# Patient Record
Sex: MALE | Race: WHITE | NOT HISPANIC OR LATINO | URBAN - METROPOLITAN AREA
[De-identification: names, ages, dates, MRNs, and addresses within clinical notes are randomized per-mention and may not be internally consistent; named-entity substitution may affect disease eponyms.]

---

## 2020-07-25 ENCOUNTER — TELEPHONE ENCOUNTER (OUTPATIENT)
Dept: URBAN - METROPOLITAN AREA CLINIC 13 | Facility: CLINIC | Age: 70
End: 2020-07-25

## 2020-07-26 ENCOUNTER — TELEPHONE ENCOUNTER (OUTPATIENT)
Dept: URBAN - METROPOLITAN AREA CLINIC 13 | Facility: CLINIC | Age: 70
End: 2020-07-26

## 2020-07-26 RX ORDER — TRIAMTERENE AND HYDROCHLOROTHIAZIDE 37.5; 25 MG/1; MG/1
TAKE 1 TABLET DAILY WITH FOOD TABLET ORAL
Refills: 0 | Status: ACTIVE | COMMUNITY
Start: 2017-11-09

## 2020-07-26 RX ORDER — ASPIRIN 81 MG/1
TAKE 1 TABLET DAILY TABLET ORAL
Refills: 0 | Status: ACTIVE | COMMUNITY
Start: 2017-11-09

## 2020-07-26 RX ORDER — ROSUVASTATIN CALCIUM 10 MG/1
TAKE 1 TABLET DAILY TABLET, FILM COATED ORAL
Refills: 0 | Status: ACTIVE | COMMUNITY
Start: 2017-11-09

## 2020-07-26 RX ORDER — NITROGLYCERIN 0.4 MG/1
PLACE 1 TABLET UNDER THE TONGUE EVERY 5 MINUTES FOR UP TO 3 DOSES AS NEEDED FOR CHEST PAIN.CALL 911 IF PAIN PERSISTS TABLET SUBLINGUAL
Refills: 0 | Status: ACTIVE | COMMUNITY
Start: 2017-11-09

## 2020-07-26 RX ORDER — AMLODIPINE BESYLATE 10 MG
TAKE 1 TABLET DAILY FOR BLOOD PRESSURE TABLET ORAL
Refills: 0 | Status: ACTIVE | COMMUNITY
Start: 2017-11-09

## 2020-07-26 RX ORDER — POLYETHYLENE GLYCOL 3350, SODIUM CHLORIDE, SODIUM BICARBONATE AND POTASSIUM CHLORIDE WITH LEMON FLAVOR 420; 11.2; 5.72; 1.48 G/4L; G/4L; G/4L; G/4L
TAKE 1/2 GALLON AT 5:00 PM DAY BEFORE PROCEDURE, TAKE SECOND 1/2 OF GALLON 6 HRS PRIOR TO PROCEDURE POWDER, FOR SOLUTION ORAL
Qty: 1 | Refills: 0 | Status: ACTIVE | COMMUNITY
Start: 2017-11-09

## 2023-06-22 ENCOUNTER — WEB ENCOUNTER (OUTPATIENT)
Dept: URBAN - METROPOLITAN AREA CLINIC 113 | Facility: CLINIC | Age: 73
End: 2023-06-22

## 2023-06-28 ENCOUNTER — OFFICE VISIT (OUTPATIENT)
Dept: URBAN - METROPOLITAN AREA CLINIC 113 | Facility: CLINIC | Age: 73
End: 2023-06-28
Payer: MEDICARE

## 2023-06-28 VITALS
HEART RATE: 57 BPM | HEIGHT: 71 IN | WEIGHT: 179.2 LBS | DIASTOLIC BLOOD PRESSURE: 60 MMHG | RESPIRATION RATE: 18 BRPM | BODY MASS INDEX: 25.09 KG/M2 | SYSTOLIC BLOOD PRESSURE: 143 MMHG

## 2023-06-28 DIAGNOSIS — R63.0 ANOREXIA: ICD-10-CM

## 2023-06-28 DIAGNOSIS — Z99.81 DEPENDENCE ON CONTINUOUS SUPPLEMENTAL OXYGEN: ICD-10-CM

## 2023-06-28 DIAGNOSIS — J69.0 ASPIRATION PNEUMONIA OF LEFT LOWER LOBE, UNSPECIFIED ASPIRATION PNEUMONIA TYPE: ICD-10-CM

## 2023-06-28 DIAGNOSIS — R63.4 WEIGHT LOSS: ICD-10-CM

## 2023-06-28 DIAGNOSIS — Z86.010 HISTORY OF COLON POLYPS: ICD-10-CM

## 2023-06-28 DIAGNOSIS — R43.2 DYSGEUSIA: ICD-10-CM

## 2023-06-28 PROBLEM — 271801002: Status: ACTIVE | Noted: 2023-06-28

## 2023-06-28 PROBLEM — 79890006: Status: ACTIVE | Noted: 2023-06-28

## 2023-06-28 PROBLEM — 60651000119103: Status: ACTIVE | Noted: 2023-06-28

## 2023-06-28 PROBLEM — 428283002: Status: ACTIVE | Noted: 2023-06-28

## 2023-06-28 PROCEDURE — 99203 OFFICE O/P NEW LOW 30 MIN: CPT | Performed by: NURSE PRACTITIONER

## 2023-06-28 RX ORDER — AMLODIPINE BESYLATE 5 MG/1
1 TABLET TABLET ORAL ONCE A DAY
Status: ACTIVE | COMMUNITY

## 2023-06-28 RX ORDER — ASPIRIN 81 MG/1
TAKE 1 TABLET DAILY TABLET ORAL
Refills: 0 | Status: ACTIVE | COMMUNITY
Start: 2017-11-09

## 2023-06-28 RX ORDER — NITROGLYCERIN 0.4 MG/1
AS DIRECTED TABLET SUBLINGUAL
Status: ACTIVE | COMMUNITY

## 2023-06-28 RX ORDER — ROSUVASTATIN CALCIUM 10 MG/1
TAKE 1 TABLET DAILY TABLET, FILM COATED ORAL
Refills: 0 | Status: ON HOLD | COMMUNITY
Start: 2017-11-09

## 2023-06-28 RX ORDER — POLYETHYLENE GLYCOL 3350, SODIUM CHLORIDE, SODIUM BICARBONATE AND POTASSIUM CHLORIDE WITH LEMON FLAVOR 420; 11.2; 5.72; 1.48 G/4L; G/4L; G/4L; G/4L
TAKE 1/2 GALLON AT 5:00 PM DAY BEFORE PROCEDURE, TAKE SECOND 1/2 OF GALLON 6 HRS PRIOR TO PROCEDURE POWDER, FOR SOLUTION ORAL
Qty: 1 | Refills: 0 | Status: ON HOLD | COMMUNITY
Start: 2017-11-09

## 2023-06-28 RX ORDER — TRIAMTERENE AND HYDROCHLOROTHIAZIDE 37.5; 25 MG/1; MG/1
TAKE 1 TABLET DAILY WITH FOOD TABLET ORAL
Refills: 0 | Status: ON HOLD | COMMUNITY
Start: 2017-11-09

## 2023-06-28 RX ORDER — CHLORTHALIDONE 25 MG/1
AS DIRECTED TABLET ORAL
Status: ACTIVE | COMMUNITY

## 2023-06-28 RX ORDER — AMLODIPINE BESYLATE 10 MG
TAKE 1 TABLET DAILY FOR BLOOD PRESSURE TABLET ORAL
Refills: 0 | Status: ON HOLD | COMMUNITY
Start: 2017-11-09

## 2023-06-28 RX ORDER — NITROGLYCERIN 0.4 MG/1
PLACE 1 TABLET UNDER THE TONGUE EVERY 5 MINUTES FOR UP TO 3 DOSES AS NEEDED FOR CHEST PAIN.CALL 911 IF PAIN PERSISTS TABLET SUBLINGUAL
Refills: 0 | Status: ON HOLD | COMMUNITY
Start: 2017-11-09

## 2023-06-28 RX ORDER — MONTELUKAST 10 MG/1
1 TABLET TABLET, FILM COATED ORAL ONCE A DAY
Status: ACTIVE | COMMUNITY

## 2023-06-28 RX ORDER — VALSARTAN 320 MG/1
1 TABLET TABLET, FILM COATED ORAL ONCE A DAY
Status: ACTIVE | COMMUNITY

## 2023-06-28 RX ORDER — BUDESONIDE AND FORMOTEROL FUMARATE DIHYDRATE 160; 4.5 UG/1; UG/1
2 PUFFS AEROSOL RESPIRATORY (INHALATION) TWICE A DAY
Status: ACTIVE | COMMUNITY

## 2023-06-28 RX ORDER — ATORVASTATIN CALCIUM 40 MG/1
1 TABLET TABLET, FILM COATED ORAL ONCE A DAY
Status: ACTIVE | COMMUNITY

## 2023-06-28 NOTE — HPI-TODAY'S VISIT:
74 yo male referred by Dr. Richard Ramírez for colon cancer screening and unintentional weight loss. A copy of today's visit will be forwarded to the referring provider.  He has a past medical history of asthma, hypertension, hyperlipidemia, tobacco use, COPD on 2L continuous O2, abnormal stress test using thallium in 2021 with normal LHC in 2021 by Dr. Haley/ CAD in 2018, GERD.  CT a/p with contrast 5/31/23: No acute process in abdomen or pelvis. Imaging findings consistent with bronchitis/bronchiolitis or aspiration pneumonitis. A 7 mm subpleural nodule in the left lower lobe previously measuring 4 mm in size. Cholelithiasis.  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 Moderate concentric left ventricular hypertrophy. Impaired relaxation pattern of LV diastolic filling. LV EF estimated to 60-65% with moderately increased LV wall thickening. Mildly thickened  mitral valve. Moderate mitral annular calcification.  Labs 5.31.23: WBC 32, Hg 16.5, Plt 306, Tbili 1.4, ALT 38, AST 30, ALP 92, Na 122, K 45, BUN 8. Crt 0.7, ESR 1,  Labs from 6/15/23 revealed a ferritin 215.9, iron 40, TIBC 241, and iron saturation 17%. Hg was 13, decreased from around 15 a month prior. He was planned for stool cards, of which we do not have results. His WBC is decreasing. He was noted to have pneumonia on chest imaging, though chest XR was normal. He was referred to hematology. Repeat hepatic function panel on 6/1/23 showed normalization of bilirubin.  According to our scales, he is down 10 pounds since his last visit with us in 2017. His last colonoscopy was in February 2011. This was notable for abnormal skin texture in perianal area without ulceration or mass. Removal of a 5 mm tubular adenoma from the ascending colon, 6 mm hyperplastic polyp from the sigmoid colon, and three 3 to 5 mm hyperplastic polyps from the rectum. Diverticulosis in the sigmoid colon. A repeat colonoscopy was recommended in 3 years time.  He had sudden onset of dysgeusia, loss of appetite, and ultimately lost around 20 pounds over about 2 weeks. He also had about 4 days worth of diarrhea. He was hospitalized from 6/2/23 to [facility]. He was hospitalized for 4 days in the ER, and then another 3 days on 5th floor. He was treated for left lower lobe pneumonia. He was treated with steroids, antibiotics (IV Zosyn), IV fluids, and had some changes to his blood pressure medications.   He tells me that he submitted hemoccult cards, but has not heard any results from these. He tells me that he never repeated a colonoscopy following the 2011 exam due to concerns for colon perforation. He has been on oxygen since hospital discharge, 2- 2.5 L continuous O2. Prior to oxygen use, he was becoming weak and short of breath with activity.  He tells me that his appetite is improved. He is not losing any more weight. His weight has been stable in the upper 170s, low 180s. There is no dysphagia. He does not have any heartburn. There is no nausea or vomiting. There is no early satiety. There is no melena. His bowels are moving daily, normal form, and without any red blood per rectum.    He saw Dr. Chanel about 2 days ago.

## 2023-08-30 ENCOUNTER — OFFICE VISIT (OUTPATIENT)
Dept: URBAN - METROPOLITAN AREA CLINIC 113 | Facility: CLINIC | Age: 73
End: 2023-08-30

## 2023-08-31 ENCOUNTER — OFFICE VISIT (OUTPATIENT)
Dept: URBAN - METROPOLITAN AREA CLINIC 113 | Facility: CLINIC | Age: 73
End: 2023-08-31

## 2023-11-01 ENCOUNTER — DASHBOARD ENCOUNTERS (OUTPATIENT)
Age: 73
End: 2023-11-01

## 2023-11-01 ENCOUNTER — OFFICE VISIT (OUTPATIENT)
Dept: URBAN - METROPOLITAN AREA CLINIC 113 | Facility: CLINIC | Age: 73
End: 2023-11-01
Payer: MEDICARE

## 2023-11-01 VITALS
DIASTOLIC BLOOD PRESSURE: 60 MMHG | BODY MASS INDEX: 27.24 KG/M2 | WEIGHT: 194.6 LBS | HEART RATE: 62 BPM | HEIGHT: 71 IN | TEMPERATURE: 97.1 F | RESPIRATION RATE: 16 BRPM | SYSTOLIC BLOOD PRESSURE: 136 MMHG

## 2023-11-01 DIAGNOSIS — R63.4 WEIGHT LOSS: ICD-10-CM

## 2023-11-01 DIAGNOSIS — Z86.010 HISTORY OF COLON POLYPS: ICD-10-CM

## 2023-11-01 DIAGNOSIS — Z85.6 PERSONAL HISTORY OF CLL (CHRONIC LYMPHOCYTIC LEUKEMIA): ICD-10-CM

## 2023-11-01 PROBLEM — 63581000119104: Status: ACTIVE | Noted: 2023-11-01

## 2023-11-01 PROCEDURE — 99213 OFFICE O/P EST LOW 20 MIN: CPT | Performed by: INTERNAL MEDICINE

## 2023-11-01 RX ORDER — AMLODIPINE BESYLATE 5 MG/1
1 TABLET TABLET ORAL ONCE A DAY
Status: ACTIVE | COMMUNITY

## 2023-11-01 RX ORDER — TRIAMTERENE AND HYDROCHLOROTHIAZIDE 37.5; 25 MG/1; MG/1
TAKE 1 TABLET DAILY WITH FOOD TABLET ORAL
Refills: 0 | Status: ON HOLD | COMMUNITY
Start: 2017-11-09

## 2023-11-01 RX ORDER — NITROGLYCERIN 0.4 MG/1
PLACE 1 TABLET UNDER THE TONGUE EVERY 5 MINUTES FOR UP TO 3 DOSES AS NEEDED FOR CHEST PAIN.CALL 911 IF PAIN PERSISTS TABLET SUBLINGUAL
Refills: 0 | Status: ON HOLD | COMMUNITY
Start: 2017-11-09

## 2023-11-01 RX ORDER — AMLODIPINE BESYLATE 10 MG
TAKE 1 TABLET DAILY FOR BLOOD PRESSURE TABLET ORAL
Refills: 0 | Status: ON HOLD | COMMUNITY
Start: 2017-11-09

## 2023-11-01 RX ORDER — CHLORTHALIDONE 25 MG/1
AS DIRECTED TABLET ORAL
Status: ON HOLD | COMMUNITY

## 2023-11-01 RX ORDER — VALSARTAN 320 MG/1
1 TABLET TABLET, FILM COATED ORAL ONCE A DAY
Status: ACTIVE | COMMUNITY

## 2023-11-01 RX ORDER — POLYETHYLENE GLYCOL 3350, SODIUM CHLORIDE, SODIUM BICARBONATE AND POTASSIUM CHLORIDE WITH LEMON FLAVOR 420; 11.2; 5.72; 1.48 G/4L; G/4L; G/4L; G/4L
TAKE 1/2 GALLON AT 5:00 PM DAY BEFORE PROCEDURE, TAKE SECOND 1/2 OF GALLON 6 HRS PRIOR TO PROCEDURE POWDER, FOR SOLUTION ORAL
Qty: 1 | Refills: 0 | Status: ON HOLD | COMMUNITY
Start: 2017-11-09

## 2023-11-01 RX ORDER — ROSUVASTATIN CALCIUM 10 MG/1
TAKE 1 TABLET DAILY TABLET, FILM COATED ORAL
Refills: 0 | Status: ON HOLD | COMMUNITY
Start: 2017-11-09

## 2023-11-01 RX ORDER — ASPIRIN 81 MG/1
TAKE 1 TABLET DAILY TABLET ORAL
Refills: 0 | Status: ACTIVE | COMMUNITY
Start: 2017-11-09

## 2023-11-01 RX ORDER — NITROGLYCERIN 0.4 MG/1
AS DIRECTED TABLET SUBLINGUAL
Status: ACTIVE | COMMUNITY

## 2023-11-01 RX ORDER — MONTELUKAST 10 MG/1
1 TABLET TABLET, FILM COATED ORAL ONCE A DAY
Status: ACTIVE | COMMUNITY

## 2023-11-01 RX ORDER — BUDESONIDE, GLYCOPYRROLATE, AND FORMOTEROL FUMARATE 160; 9; 4.8 UG/1; UG/1; UG/1
2 PUFFS AEROSOL, METERED RESPIRATORY (INHALATION) TWICE A DAY
Status: ACTIVE | COMMUNITY

## 2023-11-01 RX ORDER — ATORVASTATIN CALCIUM 40 MG/1
1 TABLET TABLET, FILM COATED ORAL ONCE A DAY
Status: ACTIVE | COMMUNITY

## 2023-11-01 RX ORDER — BUDESONIDE AND FORMOTEROL FUMARATE DIHYDRATE 160; 4.5 UG/1; UG/1
2 PUFFS AEROSOL RESPIRATORY (INHALATION) TWICE A DAY
Status: ON HOLD | COMMUNITY

## 2023-11-01 NOTE — HPI-TODAY'S VISIT:
Patient presents today in follow-up.  He reports she is doing better.  His weight is up to 194 pounds 179.  He has anorexia and altered taste have resolved.  He continues to follow with hematology for CLL.  Recent labs reviewed showed a white count of 18 with hemoglobin 15.5 and platelet count 123.  LFTs were normal.  He denies any other new complaints to me.  He has history of colon polyps and has not had a colonoscopy a long time but is declining colonoscopy in the past.  He continues to decline today.

## 2024-11-18 ENCOUNTER — CLAIMS CREATED FROM THE CLAIM WINDOW (OUTPATIENT)
Dept: URBAN - METROPOLITAN AREA MEDICAL CENTER 43 | Facility: MEDICAL CENTER | Age: 74
End: 2024-11-18
Payer: MEDICARE

## 2024-11-18 ENCOUNTER — TELEPHONE ENCOUNTER (OUTPATIENT)
Dept: URBAN - METROPOLITAN AREA CLINIC 113 | Facility: CLINIC | Age: 74
End: 2024-11-18

## 2024-11-18 DIAGNOSIS — Z79.02 ADMISSION FOR LONG-TERM (CURRENT) USE OF ANTIPLATELETS/ANTITHROMBOTICS: ICD-10-CM

## 2024-11-18 DIAGNOSIS — Z79.82 ASPIRIN LONG-TERM USE: ICD-10-CM

## 2024-11-18 DIAGNOSIS — D64.89 ANEMIA DUE TO OTHER CAUSE: ICD-10-CM

## 2024-11-18 PROCEDURE — 99222 1ST HOSP IP/OBS MODERATE 55: CPT | Performed by: INTERNAL MEDICINE

## 2024-12-13 ENCOUNTER — OFFICE VISIT (OUTPATIENT)
Dept: URBAN - METROPOLITAN AREA CLINIC 113 | Facility: CLINIC | Age: 74
End: 2024-12-13

## 2025-01-10 ENCOUNTER — OFFICE VISIT (OUTPATIENT)
Dept: URBAN - METROPOLITAN AREA CLINIC 113 | Facility: CLINIC | Age: 75
End: 2025-01-10
Payer: MEDICARE

## 2025-01-10 VITALS
TEMPERATURE: 97.9 F | WEIGHT: 192.6 LBS | DIASTOLIC BLOOD PRESSURE: 65 MMHG | HEART RATE: 70 BPM | RESPIRATION RATE: 20 BRPM | BODY MASS INDEX: 26.96 KG/M2 | SYSTOLIC BLOOD PRESSURE: 148 MMHG | HEIGHT: 71 IN

## 2025-01-10 DIAGNOSIS — Z86.0101 HISTORY OF ADENOMATOUS POLYP OF COLON: ICD-10-CM

## 2025-01-10 DIAGNOSIS — R79.89 ELEVATED LFTS: ICD-10-CM

## 2025-01-10 DIAGNOSIS — Z85.6 PERSONAL HISTORY OF CLL (CHRONIC LYMPHOCYTIC LEUKEMIA): ICD-10-CM

## 2025-01-10 PROCEDURE — 99214 OFFICE O/P EST MOD 30 MIN: CPT | Performed by: INTERNAL MEDICINE

## 2025-01-10 RX ORDER — AMLODIPINE BESYLATE 10 MG
TAKE 1 TABLET DAILY FOR BLOOD PRESSURE TABLET ORAL
Refills: 0 | Status: ON HOLD | COMMUNITY
Start: 2017-11-09

## 2025-01-10 RX ORDER — ATORVASTATIN CALCIUM 40 MG/1
1 TABLET TABLET, FILM COATED ORAL ONCE A DAY
Status: ACTIVE | COMMUNITY

## 2025-01-10 RX ORDER — NITROGLYCERIN 0.4 MG/1
AS DIRECTED TABLET SUBLINGUAL
Status: ACTIVE | COMMUNITY

## 2025-01-10 RX ORDER — TRIAMTERENE AND HYDROCHLOROTHIAZIDE 37.5; 25 MG/1; MG/1
TAKE 1 TABLET DAILY WITH FOOD TABLET ORAL
Refills: 0 | Status: ON HOLD | COMMUNITY
Start: 2017-11-09

## 2025-01-10 RX ORDER — POLYETHYLENE GLYCOL 3350, SODIUM CHLORIDE, SODIUM BICARBONATE AND POTASSIUM CHLORIDE WITH LEMON FLAVOR 420; 11.2; 5.72; 1.48 G/4L; G/4L; G/4L; G/4L
TAKE 1/2 GALLON AT 5:00 PM DAY BEFORE PROCEDURE, TAKE SECOND 1/2 OF GALLON 6 HRS PRIOR TO PROCEDURE POWDER, FOR SOLUTION ORAL
Qty: 1 | Refills: 0 | Status: ON HOLD | COMMUNITY
Start: 2017-11-09

## 2025-01-10 RX ORDER — ROSUVASTATIN CALCIUM 10 MG/1
TAKE 1 TABLET DAILY TABLET, FILM COATED ORAL
Refills: 0 | Status: ON HOLD | COMMUNITY
Start: 2017-11-09

## 2025-01-10 RX ORDER — VALSARTAN 320 MG/1
1 TABLET TABLET, FILM COATED ORAL ONCE A DAY
Status: ACTIVE | COMMUNITY

## 2025-01-10 RX ORDER — CHLORTHALIDONE 25 MG/1
AS DIRECTED TABLET ORAL
Status: ON HOLD | COMMUNITY

## 2025-01-10 RX ORDER — ZANUBRUTINIB 80 MG/1
2 TABLETS CAPSULE, GELATIN COATED ORAL TWICE A DAY
Status: ACTIVE | COMMUNITY
Start: 2025-01-10

## 2025-01-10 RX ORDER — BUDESONIDE AND FORMOTEROL FUMARATE DIHYDRATE 160; 4.5 UG/1; UG/1
2 PUFFS AEROSOL RESPIRATORY (INHALATION) TWICE A DAY
Status: ON HOLD | COMMUNITY

## 2025-01-10 RX ORDER — BUDESONIDE, GLYCOPYRROLATE, AND FORMOTEROL FUMARATE 160; 9; 4.8 UG/1; UG/1; UG/1
2 PUFFS AEROSOL, METERED RESPIRATORY (INHALATION) TWICE A DAY
Status: ACTIVE | COMMUNITY

## 2025-01-10 RX ORDER — ASPIRIN 81 MG/1
TAKE 1 TABLET DAILY TABLET ORAL
Refills: 0 | Status: ACTIVE | COMMUNITY
Start: 2017-11-09

## 2025-01-10 RX ORDER — NITROGLYCERIN 0.4 MG/1
PLACE 1 TABLET UNDER THE TONGUE EVERY 5 MINUTES FOR UP TO 3 DOSES AS NEEDED FOR CHEST PAIN.CALL 911 IF PAIN PERSISTS TABLET SUBLINGUAL
Refills: 0 | Status: ON HOLD | COMMUNITY
Start: 2017-11-09

## 2025-01-10 RX ORDER — MONTELUKAST 10 MG/1
1 TABLET TABLET, FILM COATED ORAL ONCE A DAY
Status: ACTIVE | COMMUNITY

## 2025-01-10 RX ORDER — AMLODIPINE BESYLATE 5 MG/1
1 TABLET TABLET ORAL ONCE A DAY
Status: ACTIVE | COMMUNITY

## 2025-01-10 NOTE — HPI-TODAY'S VISIT:
Patient presents today in follow-up.  He is followed by Dr. Chanel for CLL.  He has had abnormal liver tests.  Unclear if this has been related to his underlying disease process or his medication.  He is now on stable dosing of his CLL meds and is doing well with it.  His white count has come down.  Most recent labs I have from early December showed a normal AST and ALT which have normalized.  His alk phos and bilirubin were normal.  His white count from January is normal.  He brings with him an ultrasound which I reviewed.  This shows gallstones but no evidence of biliary obstruction.  His shear wave values are reviewed which are normal.

## 2025-01-21 ENCOUNTER — TELEPHONE ENCOUNTER (OUTPATIENT)
Dept: URBAN - METROPOLITAN AREA CLINIC 113 | Facility: CLINIC | Age: 75
End: 2025-01-21

## 2025-08-21 ENCOUNTER — OFFICE VISIT (OUTPATIENT)
Dept: URBAN - METROPOLITAN AREA CLINIC 113 | Facility: CLINIC | Age: 75
End: 2025-08-21